# Patient Record
Sex: FEMALE | Race: WHITE
[De-identification: names, ages, dates, MRNs, and addresses within clinical notes are randomized per-mention and may not be internally consistent; named-entity substitution may affect disease eponyms.]

---

## 2017-07-29 ENCOUNTER — HEALTH MAINTENANCE LETTER (OUTPATIENT)
Age: 57
End: 2017-07-29

## 2018-08-05 ENCOUNTER — HEALTH MAINTENANCE LETTER (OUTPATIENT)
Age: 58
End: 2018-08-05

## 2025-02-05 ENCOUNTER — HOSPITAL ENCOUNTER (EMERGENCY)
Facility: CLINIC | Age: 65
Discharge: HOME | End: 2025-02-05
Payer: COMMERCIAL

## 2025-02-05 VITALS
OXYGEN SATURATION: 99 % | RESPIRATION RATE: 16 BRPM | HEART RATE: 68 BPM | SYSTOLIC BLOOD PRESSURE: 126 MMHG | TEMPERATURE: 97 F | DIASTOLIC BLOOD PRESSURE: 76 MMHG

## 2025-02-05 DIAGNOSIS — B34.9 VIRAL ILLNESS: ICD-10-CM

## 2025-02-05 DIAGNOSIS — S09.90XA CLOSED HEAD INJURY, INITIAL ENCOUNTER: ICD-10-CM

## 2025-02-05 PROCEDURE — 99203 OFFICE O/P NEW LOW 30 MIN: CPT

## 2025-02-05 PROCEDURE — G0463 HOSPITAL OUTPT CLINIC VISIT: HCPCS

## 2025-02-05 RX ORDER — PREDNISONE 20 MG/1
TABLET ORAL
Qty: 10 TABLET | Refills: 0 | Status: SHIPPED | OUTPATIENT
Start: 2025-02-05

## 2025-02-05 RX ORDER — ALBUTEROL SULFATE 90 UG/1
2 INHALANT RESPIRATORY (INHALATION) EVERY 6 HOURS PRN
Qty: 18 G | Refills: 0 | Status: SHIPPED | OUTPATIENT
Start: 2025-02-05

## 2025-02-05 RX ORDER — BENZONATATE 100 MG/1
200 CAPSULE ORAL 3 TIMES DAILY PRN
Qty: 15 CAPSULE | Refills: 0 | Status: SHIPPED | OUTPATIENT
Start: 2025-02-05 | End: 2025-02-10

## 2025-02-05 ASSESSMENT — COLUMBIA-SUICIDE SEVERITY RATING SCALE - C-SSRS
2. HAVE YOU ACTUALLY HAD ANY THOUGHTS OF KILLING YOURSELF IN THE PAST MONTH?: NO
1. IN THE PAST MONTH, HAVE YOU WISHED YOU WERE DEAD OR WISHED YOU COULD GO TO SLEEP AND NOT WAKE UP?: NO
6. HAVE YOU EVER DONE ANYTHING, STARTED TO DO ANYTHING, OR PREPARED TO DO ANYTHING TO END YOUR LIFE?: NO

## 2025-02-05 ASSESSMENT — ACTIVITIES OF DAILY LIVING (ADL): ADLS_ACUITY_SCORE: 41

## 2025-02-05 NOTE — DISCHARGE INSTRUCTIONS
Start taking prednisone once daily for the next 5 days for cough.  You may also use albuterol inhaler and Tessalon as needed for symptomatic relief of cough.  I recommend Mucinex DM extended release for nasal congestion.  You may take up to 1000 mg of acetaminophen with 600 mg of ibuprofen every 6 hours for headache.

## 2025-02-05 NOTE — ED PROVIDER NOTES
History     Chief Complaint   Patient presents with    Sinus Problem    Head Injury     HPI  Kylee Horn is a 64 year old female who presents urgent care with 2 separate chief complaints.  First chief complaint is regarding injury.  Patient reports she was on a skiing trip over the weekend when she fell 2 separate times first time falling forward hitting her face on the ground second time she fell backwards hitting the back of her head.  Since that time she has had a headache.  She did take ibuprofen and Tylenol without relief.  She did subsequently developed a viral infection 3 days ago.  She has had sinus congestion and deep cough.  Patient has been taking over-the-counter cold and flu medications without relief.  Patient denies loss of consciousness, vision changes, vomiting, nausea, chest pain, shortness of breath.    Allergies:  Allergies   Allergen Reactions    Morphine And Codeine Nausea       Problem List:    Patient Active Problem List    Diagnosis Date Noted    CARDIOVASCULAR SCREENING; LDL GOAL LESS THAN 160 2011     Priority: Medium    Herpes simplex type 1 infection 2011     Priority: Medium     Recurrent herpes labialis, takes Valtrex for this, prescribed elsewhere.           Past Medical History:    Past Medical History:   Diagnosis Date    Lumbago        Past Surgical History:    Past Surgical History:   Procedure Laterality Date     x2         Family History:    Family History   Problem Relation Age of Onset    Hypertension Mother     Diabetes Paternal Grandmother     Hypertension Sister     Cerebrovascular Disease No family hx of     Breast Cancer No family hx of     Prostate Cancer No family hx of     Cancer - colorectal No family hx of        Social History:  Marital Status:   [4]  Social History     Tobacco Use    Smoking status: Never    Smokeless tobacco: Never   Substance Use Topics    Alcohol use: Yes     Comment: Social    Drug use: No        Medications:     albuterol (PROAIR HFA/PROVENTIL HFA/VENTOLIN HFA) 108 (90 Base) MCG/ACT inhaler  amoxicillin-clavulanate (AUGMENTIN) 875-125 MG per tablet  benzonatate (TESSALON) 100 MG capsule  NO ACTIVE MEDICATIONS  predniSONE (DELTASONE) 20 MG tablet          Review of Systems   All other systems reviewed and are negative.      Physical Exam   BP: 126/76  Pulse: 68  Temp: 97  F (36.1  C)  Resp: 16  SpO2: 99 %      Physical Exam  Vitals and nursing note reviewed.   Constitutional:       General: She is not in acute distress.     Appearance: Normal appearance. She is not ill-appearing or toxic-appearing.   HENT:      Head: Normocephalic. No raccoon eyes, Rowley's sign or contusion.      Jaw: No trismus.      Right Ear: Tympanic membrane, ear canal and external ear normal. No hemotympanum.      Left Ear: Tympanic membrane, ear canal and external ear normal. No hemotympanum.      Nose: Congestion present.      Mouth/Throat:      Mouth: Mucous membranes are moist.      Pharynx: Posterior oropharyngeal erythema present. No oropharyngeal exudate.   Eyes:      Extraocular Movements: Extraocular movements intact.      Pupils: Pupils are equal, round, and reactive to light.   Cardiovascular:      Rate and Rhythm: Normal rate and regular rhythm.      Pulses: Normal pulses.      Heart sounds: Normal heart sounds.   Pulmonary:      Effort: Pulmonary effort is normal. No respiratory distress.      Breath sounds: Normal breath sounds. No stridor. No wheezing, rhonchi or rales.   Neurological:      Mental Status: She is alert.      Sensory: No sensory deficit.      Motor: No weakness.      Coordination: Coordination normal.      Gait: Gait normal.   Psychiatric:         Mood and Affect: Mood normal.         Behavior: Behavior normal.         ED Course        Procedures        No results found for this or any previous visit (from the past 24 hours).    Medications - No data to display    Assessments & Plan (with Medical Decision Making)     I  have reviewed the nursing notes.    I have reviewed the findings, diagnosis, plan and need for follow up with the patient.      Medical Decision Making    64 year old female who presents urgent care with 2 separate chief complaints.  First chief complaint is regarding injury.  Patient reports she was on a skiing trip over the weekend when she fell 2 separate times first time falling forward hitting her face on the ground second time she fell backwards hitting the back of her head.  Since that time she has had a headache.  She did take ibuprofen and Tylenol without relief.  She did subsequently developed a viral infection 3 days ago.  She has had sinus congestion and deep cough.  Patient has been taking over-the-counter cold and flu medications without relief.  Patient denies loss of consciousness, vision changes, vomiting, nausea, chest pain, shortness of breath.      Exam patient in no acute distress and vitals WNL.  Patient is neurologically intact without deficits.  Patient does have nasal congestion and mild oropharyngeal erythema.  Lungs CTAB.    I suspect a mild concussion and we discussed symptomatic relief of concussions falls precautions.  I also suspect viral illness and we discussed symptomatic relief.      Plan:Start taking prednisone once daily for the next 5 days for cough.  You may also use albuterol inhaler and Tessalon as needed for symptomatic relief of cough.  I recommend Mucinex DM extended release for nasal congestion.  You may take up to 1000 mg of acetaminophen with 600 mg of ibuprofen every 6 hours for headache.    Prior to making a final disposition on this patient the results of patient's tests and other diagnostic studies were discussed with the patient. All questions were answered. Patient expressed understanding of the plan and was amenable to it.     Disclaimer: This note consists of symbols derived from keyboarding, dictation and/or voice recognition software. As a result, there may be  errors in the script that have gone undetected. Please consider this when interpreting information found in this chart.      Discharge Medication List as of 2/5/2025  2:52 PM        START taking these medications    Details   albuterol (PROAIR HFA/PROVENTIL HFA/VENTOLIN HFA) 108 (90 Base) MCG/ACT inhaler Inhale 2 puffs into the lungs every 6 hours as needed for shortness of breath, wheezing or cough., Disp-18 g, R-0, E-PrescribePharmacy may dispense brand covered by insurance (Proair, or proventil or ventolin or generic albuterol inhaler)      benzonatate (TESSALON) 100 MG capsule Take 2 capsules (200 mg) by mouth 3 times daily as needed for cough., Disp-15 capsule, R-0, E-Prescribe      predniSONE (DELTASONE) 20 MG tablet Take two tablets (= 40mg) each day for 5 (five) days, Disp-10 tablet, R-0, E-Prescribe             Final diagnoses:   Viral illness   Closed head injury, initial encounter       2/5/2025   [facility] EMERGENCY DEPT       Sonja Nava PA-C  02/05/25 [id number]

## 2025-02-05 NOTE — ED TRIAGE NOTES
PT reports sinus concerns x3 days . Declines viral testing at this time     PT states headache since skiing trip - hit head twice on the her trip, been having headaches since. Denies vomiting or LOC or vision changes. Chronic 5/10 headache with no relief with OTC medication